# Patient Record
Sex: MALE | Race: AMERICAN INDIAN OR ALASKA NATIVE
[De-identification: names, ages, dates, MRNs, and addresses within clinical notes are randomized per-mention and may not be internally consistent; named-entity substitution may affect disease eponyms.]

---

## 2019-08-31 ENCOUNTER — HOSPITAL ENCOUNTER (EMERGENCY)
Dept: HOSPITAL 41 - JD.ED | Age: 29
Discharge: NURSING, REHAB OR LONG-TERM CARE | End: 2019-08-31
Payer: COMMERCIAL
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
## 2019-08-31 DIAGNOSIS — S65.211A: Primary | ICD-10-CM

## 2019-08-31 DIAGNOSIS — Z87.891: ICD-10-CM

## 2019-08-31 PROCEDURE — 99285 EMERGENCY DEPT VISIT HI MDM: CPT

## 2019-08-31 PROCEDURE — 96361 HYDRATE IV INFUSION ADD-ON: CPT

## 2019-08-31 PROCEDURE — 96374 THER/PROPH/DIAG INJ IV PUSH: CPT

## 2019-08-31 PROCEDURE — 96375 TX/PRO/DX INJ NEW DRUG ADDON: CPT

## 2019-08-31 NOTE — EDM.PDOC
ED HPI GENERAL MEDICAL PROBLEM





- General


Chief Complaint: Laceration


Stated Complaint: [facility]


Time Seen by Provider: 08/31/19 06:27


Source of Information: Reports: Patient


History Limitations: Reports: Altered Mental Status (Sleepy - needed to be 

woken numerous times to answer questions)





- History of Present Illness


INITIAL COMMENTS - FREE TEXT/NARRATIVE: 





The patient states that he was washing dishes at home around 05:30 to 06:00 

this morning, when he accidentally cut his right palm. He states that the knife 

"came out of nowhere". He is otherwise uninjured.





The patient is inordinately sleepy, and fell asleep during my questioning 

numerous times, requiring waking up in order to finish the history. He states 

that he slept last night, and got up around 05:00, therefore it is not clear 

why he is so sleepy. He denies recent alcohol or drug use.





His last oral solid food was around 19:00 last night.





He states that his last tetanus vaccination was this year.





The patient does not have a PCP.








Treatments PTA: Reports: IV/IO, Other (see below)


Other Treatments PTA: Fentanyl 100 mcg, 400 cc NS


  ** Right Hand


Pain Score (Numeric/FACES): 8





- Related Data


 Allergies











Allergy/AdvReac Type Severity Reaction Status Date / Time


 


No Known Allergies Allergy   Verified 08/31/19 06:13











Home Meds: 


 Home Meds





. [No Known Home Meds]  08/31/19 [History]











Past Medical History





- Past Health History


Medical/Surgical History: Denies Medical/Surgical History





Social & Family History





- Tobacco Use


Smoking Status *Q: Former Smoker


Years of Tobacco use: 1


Packs/Tins Daily: 0.3


Month/Year Tobacco Last Used: Quit July 2019





- Alcohol Use


Alcohol Use History: No





- Recreational Drug Use


Recreational Drug Use: Yes


Drug Use in Last 12 Months: No


Recreational Drug Type: Reports: Methamphetamine


Recreational Drug Use Frequency: Not Used In Over 6 Months





- Living Situation & Occupation


Living situation: Reports: Single, with Family (Sister)


Occupation: Unemployed





ED ROS GENERAL





- Review of Systems


Review Of Systems: ROS reveals no pertinent complaints other than HPI.





ED EXAM, SKIN/RASH


Exam: See Below


Exam Limited By: No Limitations


General Appearance: WD/WN, No Apparent Distress, Lethargic


Eye Exam: Bilateral Eye: EOMI, Normal Inspection


Ears: Normal External Exam, Hearing Grossly Normal


Nose: Normal Inspection


Throat/Mouth: Normal Inspection, Normal Lips, Normal Voice, No Airway Compromise


Head: Atraumatic, Normocephalic


Neck: Normal Inspection, Full Range of Motion


Respiratory/Chest: No Respiratory Distress, Lungs Clear, Normal Breath Sounds, 

No Accessory Muscle Use


Cardiovascular: Normal Peripheral Pulses, Regular Rate, Rhythm, No Edema, No 

Gallop, No JVD, No Murmur, No Rub


Peripheral Pulses: 4+: Radial (L), Radial (R)


GI/Abdominal: Normal Bowel Sounds, Soft, Non-Tender, No Organomegaly, No 

Distention, No Abnormal Bruit, No Mass


 (Male) Exam: Deferred


Rectal (Males) Exam: Deferred


Extremities: Other (There is a subcentimeter laceration to the palm, just 

medial to the midline, between the thenar and hyperthenar eminence. When exposed

, there is vigorous pulsatile arterial bleeding. All of the right fingers are 

warm, but the patient reports decreased sensation to all fingers, and he is 

reluctant to move any of them.)


Neurological: Slow to Respond (lethargic/sleepy)


Skin: Warm, Dry, Intact





Course





- Vital Signs


Last Recorded V/S: 


 Last Vital Signs











Temp  36.9 C   08/31/19 06:13


 


Pulse  87   08/31/19 06:13


 


Resp  18   08/31/19 06:13


 


BP  122/69   08/31/19 06:13


 


Pulse Ox  98   08/31/19 06:13














- Orders/Labs/Meds


Orders: 


 Active Orders 24 hr











 Category Date Time Status


 


 Sodium Chloride 0.9% @ 150 MLS/HR (1000ml Bag) Med  08/31/19 06:45 Ordered





 Sodium Chloride 0.9% [Normal Saline] 1,000 ml   





 IV ASDIRECTED   








 Medication Orders





Sodium Chloride (Normal Saline)  1,000 mls @ 150 mls/hr IV ASDIRECTED ANYA


   Last Admin: 08/31/19 06:52 Dose:  150 mls/hr








Meds: 


Medications











Generic Name Dose Route Start Last Admin





  Trade Name Freq  PRN Reason Stop Dose Admin


 


Sodium Chloride  1,000 mls @ 150 mls/hr  08/31/19 06:45  08/31/19 06:52





  Normal Saline  IV   150 mls/hr





  ASDIRECTED ANYA   Administration





     





     





     





     














Discontinued Medications














Generic Name Dose Route Start Last Admin





  Trade Name Freq  PRN Reason Stop Dose Admin


 


Hydromorphone HCl  0.5 mg  08/31/19 06:44  08/31/19 06:52





  Dilaudid  IVPUSH  08/31/19 06:45  0.5 mg





  ONETIME ONE   Administration





     





     





     





     


 


Lidocaine HCl  Confirm  08/31/19 06:56  





  Xylocaine 1%  Administered  08/31/19 06:57  





  Dose   





  50 ml   





  .ROUTE   





  .STK-MED ONE   





     





     





     





     


 


Ondansetron HCl  4 mg  08/31/19 06:44  08/31/19 06:52





  Zofran  IVPUSH  08/31/19 06:45  4 mg





  ONETIME ONE   Administration





     





     





     





     














- Re-Assessments/Exams


Free Text/Narrative Re-Assessment/Exam: 





08/31/19 06:43


Case discussed with Dr. Tate at 06:40. He will come to the ED to evaluate the 

patient, with the intention of tying the artery off, then transferring the 

patient to [place]. He did not request any blood work.








08/31/19 06:49


If transferred, the patient has no preference of [facility] versus 

[facility].








08/31/19 07:06


Dr. Tate is here, and has evaluated the patient. He injected 1% lidocaine 

without epinephrine to the area of the laceration, and applied a Doppler to the 

area. He found that the patient has a full arterial arch. He feels that there 

is median nerve damage, therefore the patient will need to be transported for 

treatment by a hand specialist.





We attempted to contact [facility], however, there is no 

answer, therefore we will try [facility].








08/31/19 07:23


Case discussed with Emir at [facility] at 07:09.





Case then discussed with Dr. Torsten Moncada, Plastic Surgeon at [facility], at 07:12, with Dr. Tate at my side. He expected that with 15 

minutes of adequate pressure, that the bleeding was stopped, however, we 

explained that the trip from [place] takes about an hour, and during that time

, the patient had a pressure dressing and tourniquet, yet when I removed the 

dressing, even with the tourniquet still on, the patient immediately started 

bleeding, therefore we were not optimistic that additional pressure would stop 

the bleeding. Dr. Tate offered to tie the artery off, however, the patient 

would still require a second surgery under microscope, which Dr. Moncada 

agreed did not make sense. The plan, then, is to reapply a pressure dressing to 

control the bleeding, then transfer the patient to their ED.








08/31/19 07:38


Notified by Emir at [facility] that Dr. Fabian, Emergency 

Physician at [facility], accepted the patient for transfer to their ED.





Departure





- Departure


Time of Disposition: 07:22


Disposition: DC/[facility]fer to [facility]


Condition: Good


Clinical Impression: 


 Injury of palmar artery of right hand








- Discharge Information


*PRESCRIPTION DRUG MONITORING PROGRAM REVIEWED*: No


*COPY OF PRESCRIPTION DRUG MONITORING REPORT IN PATIENT XUAN: No


Forms:  ED Department Discharge





- My Orders


Last 24 Hours: 


My Active Orders





08/31/19 06:45


Sodium Chloride 0.9% @ 150 MLS/HR (1000ml Bag) Sodium Chloride 0.9% [Normal 

Saline] 1,000 ml IV ASDIRECTED 














- Assessment/Plan


Last 24 Hours: 


My Active Orders





08/31/19 06:45


Sodium Chloride 0.9% @ 150 MLS/HR (1000ml Bag) Sodium Chloride 0.9% [Normal 

Saline] 1,000 ml IV ASDIRECTED